# Patient Record
Sex: FEMALE | Race: BLACK OR AFRICAN AMERICAN | URBAN - METROPOLITAN AREA
[De-identification: names, ages, dates, MRNs, and addresses within clinical notes are randomized per-mention and may not be internally consistent; named-entity substitution may affect disease eponyms.]

---

## 2020-03-06 ENCOUNTER — HISTORICAL (OUTPATIENT)
Dept: RADIOLOGY | Facility: HOSPITAL | Age: 20
End: 2020-03-06

## 2020-12-18 ENCOUNTER — HISTORICAL (OUTPATIENT)
Dept: ADMINISTRATIVE | Facility: HOSPITAL | Age: 20
End: 2020-12-18

## 2020-12-20 LAB — FINAL CULTURE: NORMAL

## 2021-03-22 ENCOUNTER — HOSPITAL ENCOUNTER (OUTPATIENT)
Dept: OBSTETRICS AND GYNECOLOGY | Facility: HOSPITAL | Age: 21
End: 2021-03-22
Attending: STUDENT IN AN ORGANIZED HEALTH CARE EDUCATION/TRAINING PROGRAM | Admitting: STUDENT IN AN ORGANIZED HEALTH CARE EDUCATION/TRAINING PROGRAM

## 2021-04-08 ENCOUNTER — HOSPITAL ENCOUNTER (OUTPATIENT)
Dept: OBSTETRICS AND GYNECOLOGY | Facility: HOSPITAL | Age: 21
End: 2021-04-09
Attending: STUDENT IN AN ORGANIZED HEALTH CARE EDUCATION/TRAINING PROGRAM | Admitting: STUDENT IN AN ORGANIZED HEALTH CARE EDUCATION/TRAINING PROGRAM

## 2021-05-07 ENCOUNTER — HISTORICAL (OUTPATIENT)
Dept: RADIOLOGY | Facility: HOSPITAL | Age: 21
End: 2021-05-07

## 2022-05-02 NOTE — HISTORICAL [facility] CERNER
This is a historical note converted from Prerna. Formatting and pictures may have been removed.  Please reference Prerna for original formatting and attached multimedia. Admit and Discharge Dates  Admit Date: 04/08/2021  Discharge Date: 04/09/2021  Physicians  Attending Physician - Marina WILLAMS, Turner ROBLES.  Admitting Physician - Marina WILLAMS, Turner SIM  Consulting Physician - Nicol WILLAMS, Zhang FLORENCE  Primary Care Physician - Daphne Craig  Discharge Diagnosis  Diabetes?E11.9  Headache?R51.9  Hypertension?I10  Surgical Procedures  No procedures recorded for this visit.  Immunizations  No immunizations recorded for this visit.  Significant Findings  Admitted for OBS for HA, diabetes control  Objective  Vitals & Measurements  T:?35.6? ?C (Temporal Artery)? TMIN:?35.6? ?C (Temporal Artery)? TMAX:?36.5? ?C (Temporal Artery)? HR:?89(Monitored)? RR:?20? BP:?149/67?  Physical Exam  benign, no change since admission  Patient Discharge Condition  stable  Discharge Disposition  home   Discharge Medication Reconciliation  Discharge Med Rec is not complete  Follow up  Next thursday  Car Seat Challenge  No Qualifying Data